# Patient Record
(demographics unavailable — no encounter records)

---

## 2024-12-06 NOTE — PHYSICAL EXAM
[Chaperone Present] : A chaperone was present in the examining room during all aspects of the physical examination [87243] : A chaperone was present during the pelvic exam. [FreeTextEntry2] : Gayle Medina [Appropriately responsive] : appropriately responsive [Alert] : alert [No Acute Distress] : no acute distress [No Lymphadenopathy] : no lymphadenopathy [Regular Rate Rhythm] : regular rate rhythm [No Murmurs] : no murmurs [Clear to Auscultation B/L] : clear to auscultation bilaterally [Soft] : soft [Non-tender] : non-tender [Non-distended] : non-distended [No HSM] : No HSM [No Lesions] : no lesions [No Mass] : no mass [Oriented x3] : oriented x3 [FreeTextEntry6] :  no masses, nipple discharge or  axillary adenopathy [Labia Majora] : normal [Labia Minora] : normal [Atrophy] : atrophy [Normal] : normal [Uterine Adnexae] : normal [FreeTextEntry9] : Deferred due to recent colonoscopy

## 2024-12-06 NOTE — PLAN
[FreeTextEntry1] : Normal annual examination A Pap smear is performed Breast imaging is normal Patient continues on Pulmicort metoprolol a statin and famotidine Weightbearing exercises encouraged Patient had colonoscopy earlier this year with findings of a tubular adenoma and was advised by her gastroenterologist to repeat in 3 to 5 years I have encouraged the patient to encourage first-degree relatives to make sure they are up-to-date with her colonoscopies and also to discuss with her pulmonologist whether or not serial screening for lung cancer is indicated due to family history of mother and sister with lung cancer Follow-up 1 year with mammogram in 1 year

## 2024-12-06 NOTE — HISTORY OF PRESENT ILLNESS
[FreeTextEntry1] : Patient is here patient is here for annual examination There are no gynecological issues There have been no changes in medications since last year